# Patient Record
Sex: FEMALE | Race: WHITE | NOT HISPANIC OR LATINO | URBAN - METROPOLITAN AREA
[De-identification: names, ages, dates, MRNs, and addresses within clinical notes are randomized per-mention and may not be internally consistent; named-entity substitution may affect disease eponyms.]

---

## 2019-08-31 ENCOUNTER — EMERGENCY (EMERGENCY)
Facility: HOSPITAL | Age: 44
LOS: 1 days | Discharge: HOME | End: 2019-08-31
Attending: EMERGENCY MEDICINE | Admitting: EMERGENCY MEDICINE
Payer: COMMERCIAL

## 2019-08-31 VITALS
RESPIRATION RATE: 16 BRPM | DIASTOLIC BLOOD PRESSURE: 67 MMHG | OXYGEN SATURATION: 99 % | HEART RATE: 63 BPM | SYSTOLIC BLOOD PRESSURE: 121 MMHG

## 2019-08-31 VITALS
HEART RATE: 69 BPM | DIASTOLIC BLOOD PRESSURE: 77 MMHG | RESPIRATION RATE: 18 BRPM | TEMPERATURE: 98 F | SYSTOLIC BLOOD PRESSURE: 134 MMHG | OXYGEN SATURATION: 98 %

## 2019-08-31 PROCEDURE — 99284 EMERGENCY DEPT VISIT MOD MDM: CPT

## 2019-08-31 PROCEDURE — 93971 EXTREMITY STUDY: CPT | Mod: 26,LT

## 2019-08-31 RX ORDER — INDOMETHACIN 50 MG
1 CAPSULE ORAL
Qty: 21 | Refills: 0
Start: 2019-08-31 | End: 2019-09-06

## 2019-08-31 NOTE — ED PROVIDER NOTE - PATIENT PORTAL LINK FT
You can access the FollowMyHealth Patient Portal offered by [facility] by registering at the following website: [email or web address]. By joining [employer]’s FollowMyHealth portal, you will also be able to view your health information using other applications (apps) compatible with our system.

## 2019-08-31 NOTE — ED ADULT TRIAGE NOTE - CHIEF COMPLAINT QUOTE
Pt walked into ED c.o left leg pain. Pt states "I have had this pain in my thigh for a couple of days but today thepain is mostly behind my knee". Pt does not remember hurting leg and states that pain is an 8/10 and worse with walking. Pt denies n/t to leg or foot.

## 2019-08-31 NOTE — ED PROVIDER NOTE - OBJECTIVE STATEMENT
42 y/o F with no PMHx presents to the ED c/o LLE pain and swelling for 5 days, worsening within the last 2 days. Pt reports LLE pain and swelling, pain is worse at the posterior knee, radiating to the L calf, cramping and painful in quality, worse with bearing weight and walking. Pt denies fevers, chills, N/V/D, abdominal pain, CP, SOB, weakness, numbness, tingling. Denies trauma to the area. Recent travel to [place] earlier this week, pt flies frequently.

## 2019-09-05 DIAGNOSIS — M71.22 SYNOVIAL CYST OF POPLITEAL SPACE [BAKER], LEFT KNEE: ICD-10-CM

## 2019-09-05 DIAGNOSIS — M79.89 OTHER SPECIFIED SOFT TISSUE DISORDERS: ICD-10-CM

## 2021-03-12 ENCOUNTER — TRANSCRIPTION ENCOUNTER (OUTPATIENT)
Age: 46
End: 2021-03-12

## 2021-03-14 ENCOUNTER — TRANSCRIPTION ENCOUNTER (OUTPATIENT)
Age: 46
End: 2021-03-14

## 2021-06-21 ENCOUNTER — EMERGENCY (EMERGENCY)
Facility: HOSPITAL | Age: 46
LOS: 1 days | Discharge: HOME | End: 2021-06-21
Attending: EMERGENCY MEDICINE | Admitting: EMERGENCY MEDICINE
Payer: COMMERCIAL

## 2021-06-21 VITALS
OXYGEN SATURATION: 100 % | HEIGHT: 72 IN | RESPIRATION RATE: 18 BRPM | WEIGHT: 205.03 LBS | TEMPERATURE: 98 F | HEART RATE: 47 BPM | SYSTOLIC BLOOD PRESSURE: 127 MMHG | DIASTOLIC BLOOD PRESSURE: 75 MMHG

## 2021-06-21 VITALS
HEART RATE: 52 BPM | SYSTOLIC BLOOD PRESSURE: 127 MMHG | DIASTOLIC BLOOD PRESSURE: 79 MMHG | RESPIRATION RATE: 16 BRPM | TEMPERATURE: 98 F | OXYGEN SATURATION: 99 %

## 2021-06-21 DIAGNOSIS — D21.9 BENIGN NEOPLASM OF CONNECTIVE AND OTHER SOFT TISSUE, UNSPECIFIED: ICD-10-CM

## 2021-06-21 DIAGNOSIS — Z20.822 CONTACT WITH AND (SUSPECTED) EXPOSURE TO COVID-19: ICD-10-CM

## 2021-06-21 DIAGNOSIS — R10.32 LEFT LOWER QUADRANT PAIN: ICD-10-CM

## 2021-06-21 LAB
ALBUMIN SERPL ELPH-MCNC: 3.9 G/DL — SIGNIFICANT CHANGE UP (ref 3.4–5)
ALP SERPL-CCNC: 47 U/L — SIGNIFICANT CHANGE UP (ref 40–120)
ALT FLD-CCNC: 22 U/L — SIGNIFICANT CHANGE UP (ref 12–42)
ANION GAP SERPL CALC-SCNC: 9 MMOL/L — SIGNIFICANT CHANGE UP (ref 9–16)
APPEARANCE UR: CLEAR — SIGNIFICANT CHANGE UP
APTT BLD: 23.3 SEC — LOW (ref 27.5–35.5)
AST SERPL-CCNC: 16 U/L — SIGNIFICANT CHANGE UP (ref 15–37)
BASOPHILS # BLD AUTO: 0.06 K/UL — SIGNIFICANT CHANGE UP (ref 0–0.2)
BASOPHILS NFR BLD AUTO: 0.7 % — SIGNIFICANT CHANGE UP (ref 0–2)
BILIRUB SERPL-MCNC: 1 MG/DL — SIGNIFICANT CHANGE UP (ref 0.2–1.2)
BILIRUB UR-MCNC: NEGATIVE — SIGNIFICANT CHANGE UP
BUN SERPL-MCNC: 15 MG/DL — SIGNIFICANT CHANGE UP (ref 7–23)
CALCIUM SERPL-MCNC: 8.9 MG/DL — SIGNIFICANT CHANGE UP (ref 8.5–10.5)
CHLORIDE SERPL-SCNC: 107 MMOL/L — SIGNIFICANT CHANGE UP (ref 96–108)
CO2 SERPL-SCNC: 27 MMOL/L — SIGNIFICANT CHANGE UP (ref 22–31)
COLOR SPEC: YELLOW — SIGNIFICANT CHANGE UP
CREAT SERPL-MCNC: 0.78 MG/DL — SIGNIFICANT CHANGE UP (ref 0.5–1.3)
DIFF PNL FLD: NEGATIVE — SIGNIFICANT CHANGE UP
EOSINOPHIL # BLD AUTO: 0.01 K/UL — SIGNIFICANT CHANGE UP (ref 0–0.5)
EOSINOPHIL NFR BLD AUTO: 0.1 % — SIGNIFICANT CHANGE UP (ref 0–6)
GLUCOSE SERPL-MCNC: 112 MG/DL — HIGH (ref 70–99)
GLUCOSE UR QL: NEGATIVE — SIGNIFICANT CHANGE UP
HCG SERPL-ACNC: <1 MIU/ML — SIGNIFICANT CHANGE UP
HCG UR QL: NEGATIVE — SIGNIFICANT CHANGE UP
HCT VFR BLD CALC: 40 % — SIGNIFICANT CHANGE UP (ref 34.5–45)
HGB BLD-MCNC: 13 G/DL — SIGNIFICANT CHANGE UP (ref 11.5–15.5)
IMM GRANULOCYTES NFR BLD AUTO: 0.4 % — SIGNIFICANT CHANGE UP (ref 0–1.5)
INR BLD: 0.96 — SIGNIFICANT CHANGE UP (ref 0.88–1.16)
KETONES UR-MCNC: NEGATIVE — SIGNIFICANT CHANGE UP
LEUKOCYTE ESTERASE UR-ACNC: NEGATIVE — SIGNIFICANT CHANGE UP
LYMPHOCYTES # BLD AUTO: 1.02 K/UL — SIGNIFICANT CHANGE UP (ref 1–3.3)
LYMPHOCYTES # BLD AUTO: 12.2 % — LOW (ref 13–44)
MCHC RBC-ENTMCNC: 29.2 PG — SIGNIFICANT CHANGE UP (ref 27–34)
MCHC RBC-ENTMCNC: 32.5 GM/DL — SIGNIFICANT CHANGE UP (ref 32–36)
MCV RBC AUTO: 89.9 FL — SIGNIFICANT CHANGE UP (ref 80–100)
MONOCYTES # BLD AUTO: 0.38 K/UL — SIGNIFICANT CHANGE UP (ref 0–0.9)
MONOCYTES NFR BLD AUTO: 4.5 % — SIGNIFICANT CHANGE UP (ref 2–14)
NEUTROPHILS # BLD AUTO: 6.88 K/UL — SIGNIFICANT CHANGE UP (ref 1.8–7.4)
NEUTROPHILS NFR BLD AUTO: 82.1 % — HIGH (ref 43–77)
NITRITE UR-MCNC: NEGATIVE — SIGNIFICANT CHANGE UP
NRBC # BLD: 0 /100 WBCS — SIGNIFICANT CHANGE UP (ref 0–0)
PH UR: 8 — SIGNIFICANT CHANGE UP (ref 5–8)
PLATELET # BLD AUTO: 263 K/UL — SIGNIFICANT CHANGE UP (ref 150–400)
POTASSIUM SERPL-MCNC: 4.1 MMOL/L — SIGNIFICANT CHANGE UP (ref 3.5–5.3)
POTASSIUM SERPL-SCNC: 4.1 MMOL/L — SIGNIFICANT CHANGE UP (ref 3.5–5.3)
PROT SERPL-MCNC: 7.3 G/DL — SIGNIFICANT CHANGE UP (ref 6.4–8.2)
PROT UR-MCNC: NEGATIVE MG/DL — SIGNIFICANT CHANGE UP
PROTHROM AB SERPL-ACNC: 11.4 SEC — SIGNIFICANT CHANGE UP (ref 10.6–13.6)
RBC # BLD: 4.45 M/UL — SIGNIFICANT CHANGE UP (ref 3.8–5.2)
RBC # FLD: 14.1 % — SIGNIFICANT CHANGE UP (ref 10.3–14.5)
SARS-COV-2 RNA SPEC QL NAA+PROBE: SIGNIFICANT CHANGE UP
SODIUM SERPL-SCNC: 143 MMOL/L — SIGNIFICANT CHANGE UP (ref 132–145)
SP GR SPEC: 1.01 — SIGNIFICANT CHANGE UP (ref 1–1.03)
UROBILINOGEN FLD QL: 1 E.U./DL — SIGNIFICANT CHANGE UP
WBC # BLD: 8.38 K/UL — SIGNIFICANT CHANGE UP (ref 3.8–10.5)
WBC # FLD AUTO: 8.38 K/UL — SIGNIFICANT CHANGE UP (ref 3.8–10.5)

## 2021-06-21 PROCEDURE — 99285 EMERGENCY DEPT VISIT HI MDM: CPT

## 2021-06-21 PROCEDURE — 76830 TRANSVAGINAL US NON-OB: CPT | Mod: 26

## 2021-06-21 PROCEDURE — 74177 CT ABD & PELVIS W/CONTRAST: CPT | Mod: 26

## 2021-06-21 PROCEDURE — 76856 US EXAM PELVIC COMPLETE: CPT | Mod: 26

## 2021-06-21 RX ORDER — KETOROLAC TROMETHAMINE 30 MG/ML
15 SYRINGE (ML) INJECTION ONCE
Refills: 0 | Status: DISCONTINUED | OUTPATIENT
Start: 2021-06-21 | End: 2021-06-21

## 2021-06-21 RX ORDER — ACETAMINOPHEN 500 MG
650 TABLET ORAL ONCE
Refills: 0 | Status: COMPLETED | OUTPATIENT
Start: 2021-06-21 | End: 2021-06-21

## 2021-06-21 RX ADMIN — Medication 15 MILLIGRAM(S): at 12:19

## 2021-06-21 RX ADMIN — Medication 650 MILLIGRAM(S): at 12:18

## 2021-06-21 NOTE — ED ADULT TRIAGE NOTE - CHIEF COMPLAINT QUOTE
Pt complaining of left lower abdominal pain and vomiting since this morning. Pt denies urinary symptoms, diarrhea, chest pain and sob.

## 2021-06-21 NOTE — ED PROVIDER NOTE - CARE PROVIDER_API CALL
Yong Gilliam  [address] [place] [address], [address]  Phone: [phone]  Fax: (   )    -  Follow Up Time:

## 2021-06-21 NOTE — ED PROVIDER NOTE - OBJECTIVE STATEMENT
46 yo F w/ PMHx of fibroids presents to the ED c/o L lower abdominal pain with N/V this morning. Pt denies diarrhea, chest pain, SOB, fever, chills, dysuria, hematuria, vaginal bleeding or d/c, change in bowel function, flank pain, HA, dizziness, cough, changes in diet, unexplained weight loss, Hx of kidney stones or anti-coagulant use. Pt states she had a session of coolsculpting 2 days ago without complications and denies pain to area 44 yo F w/ PMHx of fibroids presents to the ED c/o nonradiating L lower abdominal pain with N/V this morning. Pt denies diarrhea, chest pain, SOB, fever, chills, dysuria, hematuria, vaginal bleeding or d/c, change in bowel function, flank pain, HA, dizziness, cough, changes in diet, unexplained weight loss, Hx of kidney stones or anti-coagulant use. Pt states she had a session of coolsculpting 2 days ago without complications and denies pain to area.

## 2021-06-21 NOTE — ED PROVIDER NOTE - NOTES
Consult placed; patient agreeable to outpatient follow up tomorrow with Dr. Gilliam. Pain controlled.

## 2021-06-21 NOTE — ED PROVIDER NOTE - PROGRESS NOTE DETAILS
I feel that the patient has decision making capacity as the patient demonstrates ability to understand the current clinical situation and is able to communicate a choice for what they want to do. There is no evidence of intoxication of altered mentation which would preclude normal cognitive function.     The patient continually is able to express their understanding of the benefits, risks, and alternatives and continues to be able to make logical and rational choices.    Shared decision making made at bedside and after being informed of their clinical exam and results, patient elects to be discharged home with GYN follow up tomorrow with Dr. Gilliam. Spoke with Dr. Olson to confirm via transfer center. Pt reassured and declining any further ED intervention or workup, and is able to verbalize understanding of strict return precautions to the ED.    Patient is AAO x 3, appears well, appears nontoxic, is in NAD, and can ambulate with steady, brisk, unassisted gait in the ED with no signs of ataxia.

## 2021-06-21 NOTE — ED PROVIDER NOTE - CLINICAL SUMMARY MEDICAL DECISION MAKING FREE TEXT BOX
Pt presents to the ED w/ L lower abdominal pain and N/V since this morning. No fever, chills, urinary symptoms. On exam, Pt with tenderness to palpation of lower abdomen with induration and palpable masses, no CVA tenderness. Will order check labs, UA, abdominal/transvaginal ultrasound. Toradol and Tylenol ordered for symptom relief. Will reassess

## 2021-06-21 NOTE — ED PROVIDER NOTE - PATIENT PORTAL LINK FT
You can access the FollowMyHealth Patient Portal offered by [facility] by registering at the following website: [email or web address]. By joining [employer]’s FollowMyHealth portal, you will also be able to view your health information using other applications (apps) compatible with our system.

## 2021-06-21 NOTE — ED PROVIDER NOTE - GASTROINTESTINAL, MLM
Abdomen soft, +tenderness to palpation of lower abdomen with induration and palpable masses, no rebound, no guarding. no CVA tenderness

## 2021-06-21 NOTE — ED PROVIDER NOTE - PROVIDER TOKENS
FREE:[LAST:[Mane],FIRST:[Yong],PHONE:[phone]],FAX:[(   )    -],ADDRESS:[[address] [place] [address], [address]]]

## 2021-06-22 LAB
CULTURE RESULTS: SIGNIFICANT CHANGE UP
SPECIMEN SOURCE: SIGNIFICANT CHANGE UP

## 2021-08-30 PROBLEM — D21.9 BENIGN NEOPLASM OF CONNECTIVE AND OTHER SOFT TISSUE, UNSPECIFIED: Chronic | Status: ACTIVE | Noted: 2021-06-21

## 2021-09-09 ENCOUNTER — APPOINTMENT (OUTPATIENT)
Dept: INTERVENTIONAL RADIOLOGY/VASCULAR | Facility: HOSPITAL | Age: 46
End: 2021-09-09
Payer: COMMERCIAL

## 2021-09-09 ENCOUNTER — TRANSCRIPTION ENCOUNTER (OUTPATIENT)
Age: 46
End: 2021-09-09

## 2021-09-09 DIAGNOSIS — F98.8 OTHER SPECIFIED BEHAVIORAL AND EMOTIONAL DISORDERS WITH ONSET USUALLY OCCURRING IN CHILDHOOD AND ADOLESCENCE: ICD-10-CM

## 2021-09-09 DIAGNOSIS — D21.9 BENIGN NEOPLASM OF CONNECTIVE AND OTHER SOFT TISSUE, UNSPECIFIED: ICD-10-CM

## 2021-09-09 PROCEDURE — 99203 OFFICE O/P NEW LOW 30 MIN: CPT | Mod: 95

## 2021-09-10 PROBLEM — Z00.00 ENCOUNTER FOR PREVENTIVE HEALTH EXAMINATION: Status: ACTIVE | Noted: 2021-09-10

## 2021-09-10 PROBLEM — F98.8 ATTENTION DEFICIT DISORDER: Status: ACTIVE | Noted: 2021-09-10

## 2021-09-10 PROBLEM — D21.9 FIBROIDS: Status: ACTIVE | Noted: 2021-09-10

## 2021-09-10 RX ORDER — DEXTROAMPHETAMINE SULFATE, DEXTROAMPHETAMINE SACCHARATE, AMPHETAMINE ASPARTATE MONOHYDRATE, AND AMPHETAMINE SULFATE 12.5; 12.5; 12.5; 12.5 MG/1; MG/1; MG/1; MG/1
CAPSULE, EXTENDED RELEASE ORAL
Refills: 0 | Status: ACTIVE | COMMUNITY

## 2021-09-10 NOTE — CONSULT LETTER
[Dear  ___] : Dear  [unfilled], [Consult Letter:] : I had the pleasure of evaluating your patient, [unfilled]. [Please see my note below.] : Please see my note below. [Consult Closing:] : Thank you very much for allowing me to participate in the care of this patient.  If you have any questions, please do not hesitate to contact me. [Sincerely,] : Sincerely, [FreeTextEntry3] : Carlos Petit MD, FSIR\par Chief Interventional Radiology\par [facility]\par [facility]\par

## 2021-09-10 NOTE — HISTORY OF PRESENT ILLNESS
[Home] : at home, [unfilled] , at the time of the visit. [Medical Office: ([facility])___] : at the medical office located in  [Verbal consent obtained from patient] : the patient, [unfilled] [Menorrhagia] : ~T menorrhagia [Pelvic Pain] : pelvic pain [Urinary Frequency] : urinary frequency [Pressure] : pressure [Bloating] : bloating [Last Menstrual Period (___)] : Last menstrual period [unfilled] [Monthly Cycles Regular] : monthly cycles are regular [G ___] : G [unfilled] [P___] : P [unfilled] [Stable] : stable [FreeTextEntry1] : Ms Elana Robbins is a 45 year old  diagnosed with fibroids 5 years ago.  Reports heavy prolonged periods for past 3 years lasting 7 days, first 4-5 heavy. no clots.  Denies metrorrhagia.  Reports urinary frequency and abdominal distention. MRI shows enlarged fibroid uterus.  Pap negative [Myomectomy] : no myomectomy [Bleeding In Between Periods] : no bleeding in between periods [Lesions/ Discharge] : no lesions and or discharge [Menopausal Symptoms] : no complaints of menopausal symptoms [Post Menopause] : not post menopausal [Plans for future pregnancies within 2 years] : does not plan to have future pregnancies within 2 years

## 2021-09-10 NOTE — ASSESSMENT
[FreeTextEntry1] : Based on MRI and patient history she is a candidate for [place] which has been scheduled.

## 2021-10-22 ENCOUNTER — OUTPATIENT (OUTPATIENT)
Dept: OUTPATIENT SERVICES | Facility: HOSPITAL | Age: 46
LOS: 1 days | End: 2021-10-22
Payer: COMMERCIAL

## 2021-10-22 ENCOUNTER — APPOINTMENT (OUTPATIENT)
Dept: INTERVENTIONAL RADIOLOGY/VASCULAR | Facility: HOSPITAL | Age: 46
End: 2021-10-22

## 2021-10-22 ENCOUNTER — RESULT REVIEW (OUTPATIENT)
Age: 46
End: 2021-10-22

## 2021-10-22 PROCEDURE — 37243 VASC EMBOLIZE/OCCLUDE ORGAN: CPT

## 2021-10-22 PROCEDURE — C1887: CPT

## 2021-10-22 PROCEDURE — C1769: CPT

## 2021-10-22 PROCEDURE — C1889: CPT

## 2021-10-22 PROCEDURE — 36247 INS CATH ABD/L-EXT ART 3RD: CPT | Mod: 59

## 2021-10-22 PROCEDURE — 36247 INS CATH ABD/L-EXT ART 3RD: CPT

## 2021-10-22 PROCEDURE — C1894: CPT

## 2021-10-22 RX ORDER — IBUPROFEN 200 MG
1 TABLET ORAL
Qty: 40 | Refills: 0
Start: 2021-10-22 | End: 2021-10-31

## 2021-10-22 RX ORDER — OXYCODONE HYDROCHLORIDE 5 MG/1
1 TABLET ORAL
Qty: 20 | Refills: 0
Start: 2021-10-22 | End: 2021-10-26

## 2021-10-22 RX ORDER — ONDANSETRON 8 MG/1
1 TABLET, FILM COATED ORAL
Qty: 9 | Refills: 0
Start: 2021-10-22 | End: 2021-10-24

## 2021-11-12 ENCOUNTER — APPOINTMENT (OUTPATIENT)
Dept: INTERVENTIONAL RADIOLOGY/VASCULAR | Facility: HOSPITAL | Age: 46
End: 2021-11-12

## 2022-02-22 ENCOUNTER — APPOINTMENT (OUTPATIENT)
Dept: MRI IMAGING | Facility: HOSPITAL | Age: 47
End: 2022-02-22

## 2022-02-25 ENCOUNTER — APPOINTMENT (OUTPATIENT)
Dept: INTERVENTIONAL RADIOLOGY/VASCULAR | Facility: HOSPITAL | Age: 47
End: 2022-02-25

## 2022-11-12 NOTE — ED ADULT NURSE NOTE - PRIMARY CARE PROVIDER
[facility] [address]    [address]    [address]    [facility] [address]    Phone:  [phone]       Thank You for choosing us for your health care visit. We are glad to serve you and happy to provide you with this summary of your visit. Please help us to ensure we have accurate records. If you find anything that needs to be changed, please let our staff know as soon as possible.          Your Demographic Information     Patient Name Sex DOB Weilep, Julie N Female 1984       Ethnic Group Patient Race    Not of  or  Origin White      Your Visit Details     Date & Time Provider Department    2017 9:00 AM Deborah Ho MD [facility] [address]      Your Upcoming Appointment*(Max 10)     2017 10:00 AM CDT   US OB LIMITED with WC US  2   [facility] [address] ([facility] [address])    [address]  [address]  [facility] [address]   [phone]            2017 10:45 AM CDT   Obstetric Check with Tika Jameson MD   [facility] [address] ([facility] [address])    [address]  [address]  [place] [address]   [phone]            Monday May 01, 2017  4:30 PM CDT   Obstetric Check with Minnie Ware MD   [facility] [address] ([facility] [address])    [address]  [address]  [place] [address]   [phone]            Monday May 08, 2017  4:45 PM CDT   Obstetric Check with Minnie Ware MD   [facility] [address] ([facility] [address])    [address]  [address]  [place] [address]   [phone]            Tuesday May 16, 2017  4:30 PM CDT   Obstetric Check with Deborah Ho MD   [facility] [address] ([facility], [address])    [address]  [address]  [facility] [address]   [phone]              Your To Do List     Future Orders Please Complete On or Around Expires    US PELVIS OB EVAL LIMITED        Conditions Discussed Today or Order-Related Diagnoses        Comments    Encounter for supervision of other normal pregnancy in third trimester    -  Primary     Low lying placenta without hemorrhage, antepartum           Your Vitals Were     BP Weight LMP BMI Smoking Status       94/60 138 lb 14.2 oz (63 kg) 2016 (Exact Date) 23.11 kg/m2 Never Smoker       Medications Prescribed or Re-Ordered Today     None      Your Current Medications Are        Disp Refills Start End    ondansetron (ZOFRAN ODT) 8 MG disintegrating tablet 40 tablet 2 11/15/2016     Sig - Route: Take 1 tablet by mouth every 8 hours as needed for Nausea. - Oral    Class: Eprescribe    acetaminophen (TYLENOL) 325 MG tablet        Sig - Route: Take 650 mg by mouth every 4 hours as needed for Pain. - Oral    Class: Historical Med    Prenatal Multivit-Min-Fe-FA (PRENATAL VITAMINS PO)        Class: Historical Med    Route: Oral      Allergies     No Known Allergies      Immunizations History as of 2017     Name Date    Influenza Quadrivalent Preservative Free 10/18/2016, 10/9/2015    Tdap 3/7/2017, 2016      Problem List as of 2017     Supervision of normal pregnancy    Family history of breast cancer in mother @ 57 ( at 58) PAtient has never had genetics consultation    Low lying placenta without hemorrhage, antepartum            Patient Instructions     None      
NONAFFILIATED
A+Ox4/intact
